# Patient Record
(demographics unavailable — no encounter records)

---

## 2024-11-01 NOTE — ASSESSMENT
[FreeTextEntry1] : PRICE BENZ is here for an appointment for a followup of weight loss medications.No complaints on zepbound.  Goal weight: 150 Starting weight 253 Patient Current weight: 234-->236-->226-->218-->210.8-->197-->183-->177-->174 BMI: 37.77-->38.06-->36.4-->35.2-->34.0-->31.8-->29.5-->28.5-->28.1  Nutrition: breakfast has been consistent Breakfast - protein shakes, protein bar, yogurt, fruit, cream of wheat Lunch- salad, sandwich- turkey/tuna fish Dinner- cooking, pot roast, prepped meals at home, pasta, noodles, w/ protein snack- limited snacking  Physical activity- limited Medication compliance- good  Plan of Care: Continue metformin/topiramate combination.  Zepbound being covered, Plan to continue Zepbound 10mg x 3 months. Increase resistance exercise, weights. Commended on weight loss.

## 2025-02-05 NOTE — PLAN
[FreeTextEntry1] : WEll woman visit completed Plan for mammo/Breast US due to family hx  Discussed importance of contraception while being GLP1s

## 2025-02-05 NOTE — HISTORY OF PRESENT ILLNESS
[Y] : Patient is sexually active [N] : Patient denies prior pregnancies [No] : Patient does not have concerns regarding sex [Currently Active] : currently active [Men] : men [TextBox_4] : well woman visit  No complaints Patient stopped OCPs [Mammogramdate] : 02/26/2024 [TextBox_19] : BR1 [BreastSonogramDate] : 02/26/2024 [TextBox_25] : BR1 [PapSmeardate] : 01/31/2024 [TextBox_31] : NEG  [HPVDate] : 01/31/2024 [TextBox_78] : NEG  [LMPDate] : 02/03/2025 [MensesFreq] : 28 [MensesLength] : 3 [FreeTextEntry1] : 02/03/2025 [FreeTextEntry4] : Denies

## 2025-02-05 NOTE — PHYSICAL EXAM
[Chaperone Present] : A chaperone was present in the examining room during all aspects of the physical examination [32378] : A chaperone was present during the pelvic exam. [Appropriately responsive] : appropriately responsive [Alert] : alert [No Acute Distress] : no acute distress [Soft] : soft [Non-tender] : non-tender [Non-distended] : non-distended [No HSM] : No HSM [No Lesions] : no lesions [No Mass] : no mass [Oriented x3] : oriented x3 [Examination Of The Breasts] : a normal appearance [No Masses] : no breast masses were palpable [Labia Majora] : normal [Labia Minora] : normal [Normal] : normal [Uterine Adnexae] : normal

## 2025-02-07 NOTE — REASON FOR VISIT
[Home] : at home, [unfilled] , at the time of the visit. [Medical Office: ([facility])___] : at the medical office located in  [Telehealth (audio & video)] : This visit was provided via telehealth using real-time 2-way audio visual technology. [Follow-Up] : a follow-up visit

## 2025-02-07 NOTE — ASSESSMENT
[FreeTextEntry1] : PRICE BENZ is here for an appointment for a followup of weight loss medications. Patient feeling more hungry during the whole week. Increased appetite  Goal weight: 150 Starting weight 253 Patient Current weight: 234-->236-->226-->218-->210.8-->197-->183-->177-->181 BMI: 37.77-->38.06-->36.4-->35.2-->34.0-->31.8-->29.5-->28.5--->29.21  Nutrition: breakfast has been consistent Breakfast - protein shakes, protein bar, yogurt, fruit Lunch- salad, sandwich- turkey/tuna fish, leftovers Dinner- prepped meals at home, pasta, noodles, w/ protein snack- limited snacking  Physical activity- walks, biking 2-3x/weeks squats and weight lifting Medication compliance- good  Plan of Care: To restart metformin 1000mg Increase zepbound to 12.5mg, to reassess appetitie suppresion on 12.5mg, if well scuppresed will continue.

## 2025-04-11 NOTE — REASON FOR VISIT
[Home] : at home, [unfilled] , at the time of the visit. [Medical Office: ([facility])___] : at the medical office located in  [Telehealth (audio & video)] : This visit was provided via telehealth using real-time 2-way audio visual technology. [Verbal consent obtained from patient] : the patient, [unfilled] [Follow-Up] : a follow-up visit

## 2025-04-11 NOTE — ASSESSMENT
[FreeTextEntry1] : PRICE BENZ is here for an appointment for a followup of weight loss medications. Patient feeling still feeling hungry during the whole week 4 days prior to injection. Increased appetite  Goal weight: 150 Starting weight 253 Patient Current weight: 234-->236-->226-->218-->210.8-->197-->183-->177-->181-->178 BMI: 37.77-->38.06-->36.4-->35.2-->34.0-->31.8-->29.5-->28.5--->29.21-->28.73  Nutrition: breakfast has been consistent, bigger portions during the end of the week.  Breakfast - protein shakes, protein bar, yogurt, fruit, Lunch- salad, sandwich- turkey/tuna fish, leftovers Dinner- prepped meals at home, pasta, noodles, w/ protein snack- limited snacking  Physical activity- walks, biking 3x/weeks squats and weight lifting Medication compliance- good  Plan of Care: To restart metformin 1000mg Increase zepbound to 15mg weekly, to restart topiramate as well nightly..  To increase weight lifting/ resistance.

## 2025-06-27 NOTE — ASSESSMENT
[FreeTextEntry1] : PRICE BENZ is here for an appointment for a followup of weight loss medications. Patient feeling still feeling hungry during the whole week 4 days prior to injection. Increased appetite Patient interested in conception   Goal weight: 150 Starting weight 253 Patient Current weight: 234-->236-->226-->218-->210.8-->197-->183-->177-->181-->178 BMI: 37.77-->38.06-->36.4-->35.2-->34.0-->31.8-->29.5-->28.5--->29.21-->28.73  Nutrition:  bigger portions during the week, pt not feeling satisfied Breakfast -yogurt, fruit, cream of wheat Lunch- salad, sandwich- turkey/tuna fish, leftovers Dinner- prepped meals at home, pasta, noodles, w/ protein snack- limited snacking  Physical activity- walks, biking 1-2/weeks squats and weight lifting Medication compliance- good  Plan of Care: To continue metformin 1000mg To continue zepbound 15mg and add phentermine. To stop topiramate due to concern for fetal anomalies. And discussed stopping meds 2 months prior to conception. Discussed PNV.  Discussed protein intake -- needs 70-90g daily to keep suppression.  To increase weight lifting/ resistance.

## 2025-06-27 NOTE — REASON FOR VISIT
[Home] : at home, [unfilled] , at the time of the visit. [Medical Office: ([facility])___] : at the medical office located in  [Telehealth (audio & video)] : This visit was provided via telehealth using real-time 2-way audio visual technology. [Verbal consent obtained from patient] : the patient, [unfilled] [Follow-Up] : a follow-up visit